# Patient Record
Sex: FEMALE | Race: WHITE | NOT HISPANIC OR LATINO | URBAN - METROPOLITAN AREA
[De-identification: names, ages, dates, MRNs, and addresses within clinical notes are randomized per-mention and may not be internally consistent; named-entity substitution may affect disease eponyms.]

---

## 2017-12-07 ENCOUNTER — IMPORTED ENCOUNTER (OUTPATIENT)
Dept: URBAN - METROPOLITAN AREA CLINIC 31 | Facility: CLINIC | Age: 67
End: 2017-12-07

## 2017-12-07 PROBLEM — H02.834: Noted: 2017-12-07

## 2017-12-07 PROBLEM — H02.831: Noted: 2017-12-07

## 2017-12-07 PROBLEM — Z96.1: Noted: 2017-12-07

## 2017-12-07 PROCEDURE — 92014 COMPRE OPH EXAM EST PT 1/>: CPT

## 2017-12-07 PROCEDURE — 92015 DETERMINE REFRACTIVE STATE: CPT

## 2017-12-07 NOTE — PATIENT DISCUSSION
1.  Pseudophakia OU - IOLs stable. Monitor. 2. Dermatochalasis OU:  Refer to Oculoplastics specialist. 3.  Hold on glasses change until after lid eval.4. Return for an appointment in 1 year for comprehensive exam. with Dr. Hero Anderson.

## 2022-04-02 ASSESSMENT — TONOMETRY
OD_IOP_MMHG: 17
OS_IOP_MMHG: 17

## 2022-04-02 ASSESSMENT — VISUAL ACUITY
OD_SC: 20/40+1
OS_SC: 20/30+1

## 2022-07-30 ENCOUNTER — TELEPHONE ENCOUNTER (OUTPATIENT)
Age: 72
End: 2022-07-30

## 2022-07-30 RX ORDER — LORATADINE 5 MG
17 GRAMS TABLET,CHEWABLE ORAL
Qty: 0 | Refills: 2 | OUTPATIENT
Start: 2018-10-18 | End: 2018-11-17

## 2022-07-31 ENCOUNTER — TELEPHONE ENCOUNTER (OUTPATIENT)
Age: 72
End: 2022-07-31

## 2022-07-31 RX ORDER — LINACLOTIDE 145 UG/1
1 (ONE) CAPSULE, GELATIN COATED ORAL
Qty: 0 | Refills: 13 | Status: ACTIVE | COMMUNITY
Start: 2018-10-18

## 2022-07-31 RX ORDER — LORATADINE 5 MG
17 GRAMS TABLET,CHEWABLE ORAL
Qty: 0 | Refills: 2 | Status: ACTIVE | COMMUNITY
Start: 2018-10-18